# Patient Record
Sex: FEMALE
[De-identification: names, ages, dates, MRNs, and addresses within clinical notes are randomized per-mention and may not be internally consistent; named-entity substitution may affect disease eponyms.]

---

## 2018-03-07 LAB
ANION GAP SERPL CALC-SCNC: 12.3 MMOL/L (ref 8–16)
BASOPHILS # BLD AUTO: 0 10*3/UL (ref 0–0.1)
BASOPHILS NFR BLD AUTO: 0.7 % (ref 0–1)
BUN SERPL-MCNC: 11 MG/DL (ref 7–26)
BUN/CREAT SERPL: 16 (ref 6–25)
CALCIUM SERPL-MCNC: 10.8 MG/DL (ref 8.4–10.2)
CHLORIDE SERPL-SCNC: 106 MMOL/L (ref 98–107)
CO2 SERPL-SCNC: 24 MMOL/L (ref 22–29)
DEPRECATED NEUTROPHILS # BLD AUTO: 2.6 10*3/UL (ref 2.1–6.9)
EGFRCR SERPLBLD CKD-EPI 2021: > 60 ML/MIN (ref 60–?)
EOSINOPHIL # BLD AUTO: 0.3 10*3/UL (ref 0–0.4)
EOSINOPHIL NFR BLD AUTO: 5.9 % (ref 0–6)
ERYTHROCYTE [DISTWIDTH] IN CORD BLOOD: 14 % (ref 11.7–14.4)
GLUCOSE SERPLBLD-MCNC: 94 MG/DL (ref 74–118)
HCT VFR BLD AUTO: 42.3 % (ref 34.2–44.1)
HGB BLD-MCNC: 14.5 G/DL (ref 12–16)
LYMPHOCYTES # BLD: 2.5 10*3/UL (ref 1–3.2)
LYMPHOCYTES NFR BLD AUTO: 42.2 % (ref 18–39.1)
MCH RBC QN AUTO: 29.8 PG (ref 28–32)
MCHC RBC AUTO-ENTMCNC: 34.3 G/DL (ref 31–35)
MCV RBC AUTO: 86.9 FL (ref 81–99)
MONOCYTES # BLD AUTO: 0.4 10*3/UL (ref 0.2–0.8)
MONOCYTES NFR BLD AUTO: 6 % (ref 4.4–11.3)
NEUTS SEG NFR BLD AUTO: 44.9 % (ref 38.7–80)
PLATELET # BLD AUTO: 213 X10E3/UL (ref 140–360)
POTASSIUM SERPL-SCNC: 4.3 MMOL/L (ref 3.5–5.1)
RBC # BLD AUTO: 4.87 X10E6/UL (ref 3.6–5.1)
SODIUM SERPL-SCNC: 138 MMOL/L (ref 136–145)

## 2018-03-09 ENCOUNTER — HOSPITAL ENCOUNTER (OUTPATIENT)
Dept: HOSPITAL 88 - OR | Age: 48
Discharge: HOME | End: 2018-03-09
Attending: SURGERY
Payer: COMMERCIAL

## 2018-03-09 DIAGNOSIS — M67.431: Primary | ICD-10-CM

## 2018-03-09 DIAGNOSIS — F41.9: ICD-10-CM

## 2018-03-09 DIAGNOSIS — R53.1: ICD-10-CM

## 2018-03-09 DIAGNOSIS — K21.9: ICD-10-CM

## 2018-03-09 DIAGNOSIS — Z01.810: ICD-10-CM

## 2018-03-09 DIAGNOSIS — K44.9: ICD-10-CM

## 2018-03-09 DIAGNOSIS — Z01.812: ICD-10-CM

## 2018-03-09 PROCEDURE — 85025 COMPLETE CBC W/AUTO DIFF WBC: CPT

## 2018-03-09 PROCEDURE — 81025 URINE PREGNANCY TEST: CPT

## 2018-03-09 PROCEDURE — 88304 TISSUE EXAM BY PATHOLOGIST: CPT

## 2018-03-09 PROCEDURE — 25111 REMOVE WRIST TENDON LESION: CPT

## 2018-03-09 PROCEDURE — 36415 COLL VENOUS BLD VENIPUNCTURE: CPT

## 2018-03-09 PROCEDURE — 93005 ELECTROCARDIOGRAM TRACING: CPT

## 2018-03-09 PROCEDURE — 80048 BASIC METABOLIC PNL TOTAL CA: CPT

## 2018-03-09 NOTE — OPERATIVE REPORT
DATE OF PROCEDURE:  March 09, 2018 



PREOPERATIVE DIAGNOSIS:  A large ganglion cyst of the right anterior wrist.



POSTOPERATIVE DIAGNOSIS:  A large ganglion cyst of the right anterior 

wrist.



OPERATION PERFORMED:  Excision of ganglion cyst of the right anterior 

wrist.



ANESTHESIA:  General.



COMPLICATIONS:  None.



ESTIMATED BLOOD LOSS:  Minimal.



DESCRIPTION OF PROCEDURE:  With the patient lying in bed in the supine 

position, under good general anesthesia, the right arm and hand were 

prepped with Betadine solution and draped in the usual manner.  The area 

overlying the cyst was then infiltrated with 1/4 percent Marcaine solution. 

 An incision was made in the anterior aspect of the right wrist in the 

radial side and was carried down into the subcutaneous tissue.  

Immediately, a large multilobulated cyst was encountered.  This extended 

both towards the hand and also towards the arm and laterally around the 

lateral aspect of the of the wrist.  In the top aspect, it was tightly 

adherent obviously from chronic inflammation to the radial artery.  This 

was slowly and carefully  from the radial artery, and the cyst was 

then slowly and carefully dissected.  There was a significant blowout of 

the fascial plane with the large cyst coming through it.  The cyst had a 

rather large neck.  The cyst was then resected and sent for pathological 

examination.  The base of the cyst was suture ligated with 3-0 Prolene 

suture.  Once this was done, the whole area was thoroughly irrigated.  

Perfect hemostasis was ascertained.  The fascia overlying the area of the 

cyst was then closed with interrupted sutures of 3-0 Vicryl.  The 

subcutaneous tissue was approximated with 3-0 Vicryl, and the skin was 

closed with subcuticular 5-0 Vicryl.  Benzoin and Steri-Strips were 

applied.  A dressing was placed.  The sponge, lap and needle count was 

correct.  

Patient tolerated the procedure well and returned to the recovery room in 

stable condition.  



  





DD:  03/09/2018 11:34

DT:  03/09/2018 12:18

Job#:  [id number]

## 2018-03-22 ENCOUNTER — HOSPITAL ENCOUNTER (OUTPATIENT)
Dept: HOSPITAL 88 - NM | Age: 48
End: 2018-03-22
Attending: INTERNAL MEDICINE
Payer: COMMERCIAL

## 2018-03-22 DIAGNOSIS — E21.3: Primary | ICD-10-CM

## 2018-03-22 PROCEDURE — 78071 PARATHYRD PLANAR W/WO SUBTRJ: CPT

## 2018-03-22 NOTE — DIAGNOSTIC IMAGING REPORT
Parathyroid Scan 



Reason for exam: Hyperparathyroidism



Radiopharmaceutical: Tc-99m sestamibi 25 mCi 



After intravenous administration of the radiopharmaceutical, immediate and

2-hour planar images of the neck and upper chest were obtained. Tomographic

images of the neck and upper chest were also obtained following the initial

planar images. 



Distribution of tracer activity appears physiologic throughout the neck and

upper chest on the planar and tomographic images. No focal areas of increased

tracer accumulation are identified. On the delayed planar images, washout of

tracer from the thyroid is complete with no focal areas of persistent tracer

activity. 



Impression: Negative parathyroid scan



No enlarged hypermetabolic parathyroid glands are identified.



Signed by: Dr. Kate Davies M.D. on 3/22/2018 3:18 PM

## 2018-06-27 ENCOUNTER — HOSPITAL ENCOUNTER (OUTPATIENT)
Dept: HOSPITAL 88 - CT | Age: 48
End: 2018-06-27
Attending: INTERNAL MEDICINE
Payer: COMMERCIAL

## 2018-06-27 DIAGNOSIS — G93.9: ICD-10-CM

## 2018-06-27 DIAGNOSIS — R42: Primary | ICD-10-CM

## 2018-06-27 PROCEDURE — 70450 CT HEAD/BRAIN W/O DYE: CPT

## 2018-06-27 NOTE — DIAGNOSTIC IMAGING REPORT
EXAMINATION: Head CT without contrast.  





HISTORY:Headache and dizziness.



COMPARISON:None.

TECHNIQUE: Multidetector axial images were obtained from the foramen magnum to

the vertex without contrast. The images were reconstructed using brain and bone

algorithms.  Thin section brain images were reformatted into coronal and

sagittal planes.  

Intravenous contrast: None  



IMAGE QUALITY: Acceptable.



FINDINGS:

    Skull/scalp: No lytic or blastic. lesions.  No surgical changes.

    Parenchyma: No abnormal density.

No acute hemorrhage, mass or acute major vascular territorial infarct.

    Arteries: No density suggestive of thrombosis.   

    Dural sinuses: No abnormal density suggestive of thrombosis. 

    Ventricles: No hydrocephalus or displacement.

    Extra-axial spaces: No abnormal density.  

    Brain volume: Normal for age.

    Craniocervical junction: No mass, Chiari malformation, or basilar

invagination.

    Sella: Partial empty sella. 

    Paranasal/mastoid sinuses: Imaged portions unremarkable.





IMPRESSION:

No acute intracranial abnormality.



Signed by: Dr. Lorrie Chang M.D. on 6/27/2018 9:16 AM

## 2021-09-03 ENCOUNTER — HOSPITAL ENCOUNTER (OUTPATIENT)
Dept: HOSPITAL 88 - MAMMO | Age: 51
End: 2021-09-03
Attending: INTERNAL MEDICINE
Payer: COMMERCIAL

## 2021-09-03 DIAGNOSIS — Z12.31: Primary | ICD-10-CM

## 2021-09-03 PROCEDURE — 77067 SCR MAMMO BI INCL CAD: CPT

## 2021-10-04 ENCOUNTER — HOSPITAL ENCOUNTER (OUTPATIENT)
Dept: HOSPITAL 88 - NM | Age: 51
End: 2021-10-04
Attending: INTERNAL MEDICINE
Payer: COMMERCIAL

## 2021-10-04 DIAGNOSIS — E83.52: Primary | ICD-10-CM

## 2021-10-04 PROCEDURE — 78071 PARATHYRD PLANAR W/WO SUBTRJ: CPT
